# Patient Record
(demographics unavailable — no encounter records)

---

## 2024-10-29 NOTE — REVIEW OF SYSTEMS
[NI] : Endocrine [Nl] : Hematologic/Lymphatic [Fever] : no fever [Rash] : no rash [Eye Pain] : no eye pain [Oral Ulcers] : no oral ulcers [Chest Pain] : no chest pain or discomfort [Tachypnea] : no tachypnea [Menarche] : no ~T menarche [Limping] : no limping [Joint Pains] : arthralgias [Joint Swelling] : joint swelling  [Back Pain] : ~T no back pain [AM Stiffness] : no am stiffness [Smokers in Home] : no one in home smokes

## 2024-10-29 NOTE — REASON FOR VISIT
[Consultation: ________] : [unfilled] is a new patient being seen for a [unfilled] consultation visit [FreeTextEntry1] : R foot swelling [Patient] : patient [Mother] : mother

## 2024-10-29 NOTE — HISTORY OF PRESENT ILLNESS
[FreeTextEntry1] : Ana is a 11yo girl who presents with her mother for 3 months of L foot swelling that comes and goes, and serologies negative for RENÉ, CELSO and RNP. Occasional pain, but no AM stiffness. Seen by ortho and foot XR was negative for fracture. Active in tap dancing and softball, both of which she has been participating in for the past 3 months. Now coming to the end of the softball season.  Has received motrin 1-2 times to relieve symptoms but unclear if it helped.  Denies prolonged fevers, oral ulcers, headaches, n/v/d, blood in urine or stool. Growing well. Doing well in school.  Denies any other joint involvement (pain, swelling, AM stiffness). Denies recent illness.  Labs performed by PCP demonstrate, in addition to negative RENÉ, CELSO and RNP, grossly normal CBC with diff, CMP, ASLO.  Vaccines up to date per mother. records with PCP. [Significant Weakness] : no significant weakness [Calcinosis] : no calcinosis  [Rash] : no [unfilled] rash: [Dysphagia] : no dysphagia [Dysphonia] : no dysphonia [Gottron's Papules] : no gottron's papules [Vasculitis] : no vasculitis [Osteoporosis] : no osteoporosis [Cataracts] : no cataract [Glaucoma] : no glaucoma [CNS Disease] : no ~T CNS disease [Seizures] : no seizure [Pericarditis] : no pericarditis [Glomerulonephritis] : no glomerulonephritis [Hypertension] : no ~T hypertension [Antiphospholipid Ab (no clot)] : no antiphospholipid Ab (no clot)  [Antiphospholipid Ab (hx of clot)] : no antiphospholipid Ab (hx of clot) [Rheumatoid Arthritis] : no Rheumatoid Arthritis [Juvenile Rheumatoid Arthritis] : no Juvenile Rheumatoid Arthritis [Ankylosing Spondylitis] : no Ankylosing Spondylitis [Psoriasis] : no Psoriasis [Diabetes Mellitus (type 1 - insulin dependent)] : no Type 1 Diabetes Mellitus [Systemic Lupus Erythematosus] : no Systemic Lupus Erythematosus [Raynaud's Disease] : no Raynaud's Disease [IBD - Crohns] : no Crohn's Inflammatory Bowel disease [IBD - Ulcerative Colitis] : Ulcerative Colitis Inflammatory Bowel Disease [Graves' Disease] : no Graves' Disease [Hashimoto's Thyroiditis] : no Hashimoto's Thyroiditis [Multiple Sclerosis] : no Multiple Sclerosis [Unlimited ADLs] : able to do activities of daily living without limitations [Unlimited Sports] : able to participate in sports without limitations

## 2024-10-29 NOTE — PHYSICAL EXAM
[Acute distress] : no acute distress [Rash] : no rash [Malar Erythema] : no malar erythema [PERRLA] : MONO [Erythematous Conjunctiva] : nonerythematous conjunctiva [Erythematous Oropharynx] : nonerythematous oropharynx [Oral] : normal oral cavity  [Palate] : normal palate [Lesions] : no lesions [Erythematous] : not erythematous [Mass (___cm)] : no neck masses [S1, S2 Present] : S1, S2 present [Murmurs] : no murmurs [Peripheral Pulses] : positive peripheral pulses [Peripheral Edema] : no peripheral edema  [Respiratory Effort] : normal respiratory effort [Clear to auscultation] : clear to auscultation [Soft] : soft [NonTender] : non tender [Non Distended] : non distended [Normal Bowel Sounds] : normal bowel sounds [No Hepatosplenomegaly] : no hepatosplenomegaly [No Abnormal Lymph Nodes Palpated] : no abnormal lymph nodes palpated [Range Of Motion] : full range of motion [Joint effusions] : joint effusions [Intact Judgement] : intact judgement  [Insight Insight] : intact insight [Not Examined] : not examined [3] : 3 [FreeTextEntry1] : interactive, pleasant affect [de-identified] : mild edema top of L foot [_______] : Ankle: [unfilled] [de-identified] : non tender to palpation [de-identified] : none [de-identified] : full forward flexion

## 2024-10-29 NOTE — CONSULT LETTER
[Dear  ___] : Dear  [unfilled], [Consult Letter:] : I had the pleasure of evaluating your patient, [unfilled]. [Please see my note below.] : Please see my note below. [Consult Closing:] : Thank you very much for allowing me to participate in the care of this patient.  If you have any questions, please do not hesitate to contact me. [Sincerely,] : Sincerely, [FreeTextEntry2] : Dr Lorena Eduardo [FreeTextEntry3] : Trina Hudson MD, MS Attending Physician, Pediatric Rheumatology

## 2024-10-29 NOTE — DISCUSSION/SUMMARY
[FreeTextEntry1] : Ana is a gabriel 11 yo girl whose history and clinical presentation today are consistent with objective arthritis in several joints - L knee, L ankle, 2nd and 3rd MCPs in bilateral hands. Although one of the most common causes of arthralgias and arthritis in children is post-infectious, Ana does not endorse recent illness and ASLO is normal as drawn by PCP. Her serologies are also negative to radha e- RENÉ, CELSO, RNP.   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 Low threshold to start systemic therapy for improved disease control.   Discussed at length using NSAID for improved control of symptoms. Discussed at length mechanism of action, benefit/risk, adverse effect, and answered all of Ana and mother's questions. Escribed naprosyn at approximately 15mg/kg/day divided bid (12ml PO bid) to her pharmacy at mother's request.   RTC in 6 weeks or sooner with any concerns.  Labs reviewed with patient and parent. [Radiology] results reviewed with patient and parent.  COVID-19 preventive guidance reviewed - including masking where appropriate, frequent hand-washing, restrictions for large gatherings, and social distancing . Answered all of patient's and parent's questions. Total time spent today included reviewing prior notes, results, and time with patient/parent. Time spent - 65     minutes

## 2024-12-19 NOTE — HISTORY OF PRESENT ILLNESS
[FreeTextEntry1] : Ana is a 14yo girl who was initially seen for L foot swelling that comes and goes, but had objective arthritis on my exam,  Serologies remain negative.  XR knees demonstrates some wear and tear as expected for Ana who is very active in dance and softball.  No erosions in knees, ankles or hands. Has been taking meloxicam  daily as recommended with some improvement in symptoms per mother. Does still c/o some knee pain but no further swelling is noted, and minimal AM stiffness.  Denies prolonged fevers, oral ulcers, headaches, n/v/d, blood in urine or stool. Growing well. Doing well in school.  Denies recent illness.  Labs performed by PCP demonstrate, in addition to negative RENÉ, CELSO and RNP, grossly normal CBC with diff, CMP, ASLO.  Vaccines up to date per mother. records with PCP. [Significant Weakness] : no significant weakness [Calcinosis] : no calcinosis  [Rash] : no [unfilled] rash: [Dysphagia] : no dysphagia [Dysphonia] : no dysphonia [Gottron's Papules] : no gottron's papules [Vasculitis] : no vasculitis [Osteoporosis] : no osteoporosis [Cataracts] : no cataract [Glaucoma] : no glaucoma [CNS Disease] : no ~T CNS disease [Seizures] : no seizure [Pericarditis] : no pericarditis [Glomerulonephritis] : no glomerulonephritis [Hypertension] : no ~T hypertension [Antiphospholipid Ab (no clot)] : no antiphospholipid Ab (no clot)  [Antiphospholipid Ab (hx of clot)] : no antiphospholipid Ab (hx of clot) [Rheumatoid Arthritis] : no Rheumatoid Arthritis [Juvenile Rheumatoid Arthritis] : no Juvenile Rheumatoid Arthritis [Ankylosing Spondylitis] : no Ankylosing Spondylitis [Psoriasis] : no Psoriasis [Diabetes Mellitus (type 1 - insulin dependent)] : no Type 1 Diabetes Mellitus [Systemic Lupus Erythematosus] : no Systemic Lupus Erythematosus [Raynaud's Disease] : no Raynaud's Disease [IBD - Crohns] : no Crohn's Inflammatory Bowel disease [IBD - Ulcerative Colitis] : Ulcerative Colitis Inflammatory Bowel Disease [Graves' Disease] : no Graves' Disease [Hashimoto's Thyroiditis] : no Hashimoto's Thyroiditis [Multiple Sclerosis] : no Multiple Sclerosis [Unlimited ADLs] : able to do activities of daily living without limitations [Unlimited Sports] : able to participate in sports without limitations

## 2024-12-19 NOTE — DISCUSSION/SUMMARY
[FreeTextEntry1] : Ana is a gabriel 12yo girl with h/o L foot swelling and objective arthritis in several joints at her initial visit. She has been adherent with regular meloxicam which has resolved most of her arthritis. Does have some finger swelling today on examination.   XR are negative for erosions as performed by me. Laboratory testing is negative for arthritis and connective tissue serologies.   Continue meloxicam another 4-6 weeks, once daily with food. Escribed refill to pharmacy at mother's request.  Low threshold to discuss systemic therapy for improved disease control as needed. RTC in 6 weeks or sooner with any concerns.  Labs reviewed with patient and parent. XR results reviewed with patient and parent.  COVID-19 preventive guidance reviewed - including masking where appropriate, frequent hand-washing, restrictions for large gatherings, and social distancing . Answered all of patient's and parent's questions. Total time spent today included reviewing prior notes, results, and time with patient/parent. Time spent - 40     minutes

## 2024-12-19 NOTE — PHYSICAL EXAM
[Acute distress] : no acute distress [Rash] : no rash [Malar Erythema] : no malar erythema [PERRLA] : MONO [Erythematous Conjunctiva] : nonerythematous conjunctiva [Erythematous Oropharynx] : nonerythematous oropharynx [Oral] : normal oral cavity  [Palate] : normal palate [Lesions] : no lesions [Erythematous] : not erythematous [Mass (___cm)] : no neck masses [S1, S2 Present] : S1, S2 present [Murmurs] : no murmurs [Peripheral Pulses] : positive peripheral pulses [Peripheral Edema] : no peripheral edema  [Respiratory Effort] : normal respiratory effort [Clear to auscultation] : clear to auscultation [Soft] : soft [NonTender] : non tender [Non Distended] : non distended [Normal Bowel Sounds] : normal bowel sounds [No Hepatosplenomegaly] : no hepatosplenomegaly [No Abnormal Lymph Nodes Palpated] : no abnormal lymph nodes palpated [Range Of Motion] : full range of motion [Joint effusions] : joint effusions [Intact Judgement] : intact judgement  [Insight Insight] : intact insight [Not Examined] : not examined [2] : 2 [FreeTextEntry1] : interactive, pleasant affect [de-identified] : mild edema top of L foot [_______] : 3rd MCP: [unfilled] [de-identified] : non tender to palpation [de-identified] : none [de-identified] : full forward flexion

## 2025-03-06 NOTE — HISTORY OF PRESENT ILLNESS
[FreeTextEntry1] : 13-year-old female with no significant medical problems began noticing swelling in her left foot several months ago.  Patient had been seen by rheumatology and was treated for a presumed arthritis.  An MRI at that time was essentially negative.  Patient is active and has no physical limitations.  There is no evidence of trauma.  There has been no swelling of the right foot.  She presents to the office for evaluation.

## 2025-03-06 NOTE — PHYSICAL EXAM
[JVD] : no jugular venous distention  [Normal Breath Sounds] : Normal breath sounds [Normal Rate and Rhythm] : normal rate and rhythm [2+] : left 2+ [Ankle Swelling (On Exam)] : present [Ankle Swelling On The Left] : moderate [Varicose Veins Of Lower Extremities] : not present [] : not present [de-identified] : Appears well

## 2025-03-06 NOTE — ASSESSMENT
[FreeTextEntry1] : Because of swelling in the left lower extremity the patient underwent a venous duplex study including the iliac veins and IVC and patient was found to have compression of the left iliac vein by the right iliac artery.  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  This would explain her left foot swelling.  In the office I had a long discussion with the patient and her mother.  Her symptoms are mild at this time and there is no evidence of DVT.  I do not believe any of the interventional procedures (iliac vein angioplasty and stenting and/or transposition of the vein )are a good option at this time given the patient's young age.  Full anticoagulation could also present its own set of difficulties in this active teenager.  I think the best option for her is a baby aspirin daily along with compression stockings and leg elevation.  I will continue to follow her closely and monitor her symptoms.  She will return to the office in several months however sooner if symptoms develop.